# Patient Record
Sex: FEMALE | Race: ASIAN | Employment: FULL TIME | URBAN - METROPOLITAN AREA
[De-identification: names, ages, dates, MRNs, and addresses within clinical notes are randomized per-mention and may not be internally consistent; named-entity substitution may affect disease eponyms.]

---

## 2017-01-05 ENCOUNTER — HOSPITAL ENCOUNTER (OUTPATIENT)
Age: 37
Discharge: HOME | End: 2017-01-05
Attending: FAMILY MEDICINE
Payer: COMMERCIAL

## 2017-01-05 VITALS
OXYGEN SATURATION: 100 % | TEMPERATURE: 98 F | BODY MASS INDEX: 26.31 KG/M2 | RESPIRATION RATE: 20 BRPM | HEART RATE: 90 BPM | WEIGHT: 143 LBS | HEIGHT: 62 IN | SYSTOLIC BLOOD PRESSURE: 146 MMHG | DIASTOLIC BLOOD PRESSURE: 91 MMHG

## 2017-01-05 DIAGNOSIS — L50.9 URTICARIA: Primary | ICD-10-CM

## 2017-01-05 PROCEDURE — 99213 OFFICE O/P EST LOW 20 MIN: CPT

## 2017-01-05 PROCEDURE — 99204 OFFICE O/P NEW MOD 45 MIN: CPT

## 2017-01-05 RX ORDER — HYDROCORTISONE 25 MG/ML
1 LOTION TOPICAL 2 TIMES DAILY
Qty: 1 BOTTLE | Refills: 0 | Status: SHIPPED | OUTPATIENT
Start: 2017-01-05 | End: 2017-01-15

## 2017-01-05 RX ORDER — METHYLPREDNISOLONE 4 MG/1
TABLET ORAL
Qty: 1 PACKAGE | Refills: 0 | Status: SHIPPED | OUTPATIENT
Start: 2017-01-05 | End: 2017-01-10

## 2017-01-05 NOTE — ED PROVIDER NOTES
Patient Seen in: [address]    History   Patient presents with:  Rash Skin Problem (integumentary)    Stated Complaint: swollen eyes redness swollen face    HPI    76-year-old female presents with Over 3 weeks of rash 3- Comment: occasionally      Review of Systems    Positive for stated complaint: swollen eyes redness swollen face  Other systems are as noted in HPI. Constitutional and vital signs reviewed.       All other systems reviewed and negative except as noted Discussed need to follow-up immediately with emergency room if she develops any signs or symptoms of anaphylaxis.   Red flags of possible overlying bacterial infection discussed and patient will return immediately if she develops pain or any signs of infect

## 2017-01-05 NOTE — ED INITIAL ASSESSMENT (HPI)
Patient states having hx of allergic reaction and acne from makeup use. Patient states using retina-micro topical, redness occurred. Patient stopped using topical retina-amy about 2-3 weeks ago. Patient states facial and eye swelling since last Tuesday.

## 2017-01-28 ENCOUNTER — HOSPITAL ENCOUNTER (OUTPATIENT)
Age: 37
Discharge: ANOTHER HOSPITAL | End: 2017-01-28
Attending: EMERGENCY MEDICINE
Payer: COMMERCIAL

## 2017-01-28 ENCOUNTER — HOSPITAL ENCOUNTER (EMERGENCY)
Facility: HOSPITAL | Age: 37
Discharge: HOME | End: 2017-01-28
Attending: EMERGENCY MEDICINE
Payer: COMMERCIAL

## 2017-01-28 VITALS
WEIGHT: 143 LBS | HEIGHT: 62 IN | SYSTOLIC BLOOD PRESSURE: 110 MMHG | OXYGEN SATURATION: 100 % | HEART RATE: 99 BPM | TEMPERATURE: 100 F | BODY MASS INDEX: 26.31 KG/M2 | RESPIRATION RATE: 16 BRPM | DIASTOLIC BLOOD PRESSURE: 64 MMHG

## 2017-01-28 VITALS
WEIGHT: 125 LBS | TEMPERATURE: 102 F | SYSTOLIC BLOOD PRESSURE: 124 MMHG | OXYGEN SATURATION: 96 % | DIASTOLIC BLOOD PRESSURE: 86 MMHG | HEART RATE: 112 BPM | HEIGHT: 62 IN | BODY MASS INDEX: 23 KG/M2 | RESPIRATION RATE: 16 BRPM

## 2017-01-28 DIAGNOSIS — R50.9 FEVER, UNSPECIFIED FEVER CAUSE: Primary | ICD-10-CM

## 2017-01-28 DIAGNOSIS — L27.0 ALLERGIC DRUG RASH DUE TO SULFONAMIDE: ICD-10-CM

## 2017-01-28 DIAGNOSIS — R59.0 LYMPHADENOPATHY, CERVICAL: Primary | ICD-10-CM

## 2017-01-28 DIAGNOSIS — T37.0X5A ALLERGIC DRUG RASH DUE TO SULFONAMIDE: ICD-10-CM

## 2017-01-28 LAB
B-HCG UR QL: NEGATIVE
BASOPHILS # BLD: 0.2 K/UL (ref 0–0.2)
BASOPHILS NFR BLD: 5 %
BILIRUB UR QL: NEGATIVE
CLARITY UR: CLEAR
COLOR UR: YELLOW
EOSINOPHIL # BLD: 0 K/UL (ref 0–0.7)
EOSINOPHIL NFR BLD: 1 %
ERYTHROCYTE [DISTWIDTH] IN BLOOD BY AUTOMATED COUNT: 13.3 % (ref 11–15)
GLUCOSE UR-MCNC: NEGATIVE MG/DL
HCT VFR BLD AUTO: 42.5 % (ref 35–48)
HGB BLD-MCNC: 14.2 G/DL (ref 12–16)
HGB UR QL STRIP.AUTO: NEGATIVE
KETONES UR-MCNC: NEGATIVE MG/DL
LEUKOCYTE ESTERASE UR QL STRIP.AUTO: NEGATIVE
LYMPHOCYTES # BLD: 0.5 K/UL (ref 1–4)
LYMPHOCYTES NFR BLD: 12 %
MCH RBC QN AUTO: 29.1 PG (ref 27–32)
MCHC RBC AUTO-ENTMCNC: 33.3 G/DL (ref 32–37)
MCV RBC AUTO: 87.5 FL (ref 80–100)
MONOCYTES # BLD: 0.5 K/UL (ref 0–1)
MONOCYTES NFR BLD: 12 %
NEUTROPHILS # BLD AUTO: 2.9 K/UL (ref 1.8–7.7)
NEUTROPHILS NFR BLD: 71 %
NITRITE UR QL STRIP.AUTO: NEGATIVE
PH UR: 7 [PH] (ref 5–8)
PLATELET # BLD AUTO: 214 K/UL (ref 140–400)
PMV BLD AUTO: 7.8 FL (ref 7.4–10.3)
PROT UR-MCNC: NEGATIVE MG/DL
RBC # BLD AUTO: 4.86 M/UL (ref 3.7–5.4)
RBC #/AREA URNS AUTO: 3 /HPF
S PYO AG THROAT QL: NEGATIVE
SP GR UR STRIP: 1.01 (ref 1–1.03)
UROBILINOGEN UR STRIP-ACNC: <2
VIT C UR-MCNC: 20 MG/DL
WBC # BLD AUTO: 4.2 K/UL (ref 4–11)
WBC #/AREA URNS AUTO: 1 /HPF

## 2017-01-28 PROCEDURE — 81025 URINE PREGNANCY TEST: CPT

## 2017-01-28 PROCEDURE — 36415 COLL VENOUS BLD VENIPUNCTURE: CPT

## 2017-01-28 PROCEDURE — 87430 STREP A AG IA: CPT

## 2017-01-28 PROCEDURE — 99215 OFFICE O/P EST HI 40 MIN: CPT

## 2017-01-28 PROCEDURE — 85025 COMPLETE CBC W/AUTO DIFF WBC: CPT | Performed by: EMERGENCY MEDICINE

## 2017-01-28 PROCEDURE — 81003 URINALYSIS AUTO W/O SCOPE: CPT | Performed by: EMERGENCY MEDICINE

## 2017-01-28 PROCEDURE — 99283 EMERGENCY DEPT VISIT LOW MDM: CPT

## 2017-01-28 RX ORDER — IBUPROFEN 600 MG/1
600 TABLET ORAL ONCE
Status: COMPLETED | OUTPATIENT
Start: 2017-01-28 | End: 2017-01-28

## 2017-01-28 NOTE — ED PROVIDER NOTES
Patient Seen in: [facility] In [address] [place] [address]    History   Patient presents with:  Fever  Rash Skin Problem (integumentary)    Stated Complaint: Neck Pain    HPI    Patient is a 27-year-old female who complains of fever.   It started 2 day 124/86 mmHg   Pulse 01/28/17 1031 112   Resp 01/28/17 1031 16   Temp 01/28/17 1031 102.2 °F (39 °C)   Temp src 01/28/17 1031 Oral   SpO2 01/28/17 1031 96 %   O2 Device 01/28/17 1031 None (Room air)       Current:/86 mmHg  Pulse 112  Temp(Src) 102.2 ° that she may benefit from further evaluation.   Clinically she does not seem to have meningitis however her symptoms warrant further evaluation she agrees and will go to the emergency department  MDM           Disposition and Plan     Clinical Impression:

## 2017-01-28 NOTE — ED INITIAL ASSESSMENT (HPI)
Fever started Thursday. Skin rash which starred today. Pt was on Bactrim Last dose last night.  Also c/o neck pain

## 2017-01-28 NOTE — ED PROVIDER NOTES
Patient Seen in: [facility] Emergency Department    History   Patient presents with:  Fever Sepsis (infectious)    Stated Complaint: fever, sent from [facility]    HPI    Patient is a 58-year-old female who presents to the emergency department with a Current:/64 mmHg  Pulse 99  Temp(Src) 100.4 °F (38 °C) (Oral)  Resp 16  Ht 157.5 cm (5' 2\")  Wt 64.864 kg  BMI 26.15 kg/m2  SpO2 100%        Physical Exam   Constitutional: She is oriented to person, place, and time.  She appears well-developed ------                     CBC W/ DIFFERENTIAL[[id number]]          Abnormal            Final result                 Please view results for these tests on the individual orders.    RAINBOW DRAW GOLD   RAINBOW DRAW LIGHT GREEN       MDM           Dis

## 2017-02-26 ENCOUNTER — CHARTING TRANS (OUTPATIENT)
Dept: OTHER | Age: 37
End: 2017-02-26

## 2017-03-10 PROCEDURE — 88175 CYTOPATH C/V AUTO FLUID REDO: CPT | Performed by: OBSTETRICS & GYNECOLOGY

## 2018-03-12 PROCEDURE — 88175 CYTOPATH C/V AUTO FLUID REDO: CPT | Performed by: OBSTETRICS & GYNECOLOGY

## 2018-11-05 VITALS
HEART RATE: 80 BPM | WEIGHT: 143 LBS | RESPIRATION RATE: 20 BRPM | TEMPERATURE: 98.6 F | HEIGHT: 62 IN | DIASTOLIC BLOOD PRESSURE: 76 MMHG | SYSTOLIC BLOOD PRESSURE: 110 MMHG | BODY MASS INDEX: 26.31 KG/M2

## 2019-03-14 PROCEDURE — 88175 CYTOPATH C/V AUTO FLUID REDO: CPT | Performed by: OBSTETRICS & GYNECOLOGY

## 2022-02-18 ENCOUNTER — OFFICE VISIT (OUTPATIENT)
Dept: OBGYN CLINIC | Facility: CLINIC | Age: 42
End: 2022-02-18
Payer: COMMERCIAL

## 2022-02-18 VITALS
WEIGHT: 132 LBS | HEART RATE: 77 BPM | DIASTOLIC BLOOD PRESSURE: 78 MMHG | SYSTOLIC BLOOD PRESSURE: 121 MMHG | BODY MASS INDEX: 25 KG/M2

## 2022-02-18 DIAGNOSIS — Z12.31 ENCOUNTER FOR SCREENING MAMMOGRAM FOR BREAST CANCER: ICD-10-CM

## 2022-02-18 DIAGNOSIS — Z01.419 ENCOUNTER FOR GYNECOLOGICAL EXAMINATION: Primary | ICD-10-CM

## 2022-02-18 PROCEDURE — 3074F SYST BP LT 130 MM HG: CPT | Performed by: OBSTETRICS & GYNECOLOGY

## 2022-02-18 PROCEDURE — 3078F DIAST BP <80 MM HG: CPT | Performed by: OBSTETRICS & GYNECOLOGY

## 2022-02-18 PROCEDURE — 99386 PREV VISIT NEW AGE 40-64: CPT | Performed by: OBSTETRICS & GYNECOLOGY

## 2022-02-21 LAB — HPV I/H RISK 1 DNA SPEC QL NAA+PROBE: NEGATIVE

## 2022-03-06 ENCOUNTER — HOSPITAL ENCOUNTER (OUTPATIENT)
Dept: MAMMOGRAPHY | Facility: HOSPITAL | Age: 42
Discharge: HOME | End: 2022-03-06
Attending: OBSTETRICS & GYNECOLOGY
Payer: COMMERCIAL

## 2022-03-06 DIAGNOSIS — Z12.31 ENCOUNTER FOR SCREENING MAMMOGRAM FOR BREAST CANCER: ICD-10-CM

## 2022-03-06 PROCEDURE — 77067 SCR MAMMO BI INCL CAD: CPT | Performed by: OBSTETRICS & GYNECOLOGY

## 2022-03-06 PROCEDURE — 77063 BREAST TOMOSYNTHESIS BI: CPT | Performed by: OBSTETRICS & GYNECOLOGY

## 2023-04-04 ENCOUNTER — OFFICE VISIT (OUTPATIENT)
Dept: OBGYN CLINIC | Facility: CLINIC | Age: 43
End: 2023-04-04

## 2023-04-04 VITALS
BODY MASS INDEX: 25 KG/M2 | HEART RATE: 77 BPM | SYSTOLIC BLOOD PRESSURE: 113 MMHG | WEIGHT: 134.81 LBS | DIASTOLIC BLOOD PRESSURE: 77 MMHG

## 2023-04-04 DIAGNOSIS — Z12.31 ENCOUNTER FOR SCREENING MAMMOGRAM FOR BREAST CANCER: ICD-10-CM

## 2023-04-04 DIAGNOSIS — T83.32XA INTRAUTERINE CONTRACEPTIVE DEVICE THREADS LOST, INITIAL ENCOUNTER: ICD-10-CM

## 2023-04-04 DIAGNOSIS — Z01.419 ENCOUNTER FOR GYNECOLOGICAL EXAMINATION: Primary | ICD-10-CM

## 2023-04-25 ENCOUNTER — HOSPITAL ENCOUNTER (OUTPATIENT)
Dept: MAMMOGRAPHY | Facility: HOSPITAL | Age: 43
Discharge: HOME | End: 2023-04-25
Attending: OBSTETRICS & GYNECOLOGY
Payer: COMMERCIAL

## 2023-04-25 DIAGNOSIS — Z12.31 ENCOUNTER FOR SCREENING MAMMOGRAM FOR BREAST CANCER: ICD-10-CM

## 2023-04-25 PROCEDURE — 77063 BREAST TOMOSYNTHESIS BI: CPT | Performed by: OBSTETRICS & GYNECOLOGY

## 2023-04-25 PROCEDURE — 77067 SCR MAMMO BI INCL CAD: CPT | Performed by: OBSTETRICS & GYNECOLOGY

## 2023-05-01 ENCOUNTER — HOSPITAL ENCOUNTER (OUTPATIENT)
Dept: ULTRASOUND IMAGING | Facility: HOSPITAL | Age: 43
Discharge: HOME | End: 2023-05-01
Attending: OBSTETRICS & GYNECOLOGY
Payer: COMMERCIAL

## 2023-05-01 DIAGNOSIS — T83.32XA INTRAUTERINE CONTRACEPTIVE DEVICE THREADS LOST, INITIAL ENCOUNTER: ICD-10-CM

## 2023-05-01 PROCEDURE — 76856 US EXAM PELVIC COMPLETE: CPT | Performed by: OBSTETRICS & GYNECOLOGY

## 2023-05-01 PROCEDURE — 76830 TRANSVAGINAL US NON-OB: CPT | Performed by: OBSTETRICS & GYNECOLOGY

## 2024-04-29 ENCOUNTER — OFFICE VISIT (OUTPATIENT)
Dept: OBGYN CLINIC | Facility: CLINIC | Age: 44
End: 2024-04-29
Payer: COMMERCIAL

## 2024-04-29 VITALS
DIASTOLIC BLOOD PRESSURE: 69 MMHG | WEIGHT: 129.88 LBS | SYSTOLIC BLOOD PRESSURE: 105 MMHG | HEART RATE: 65 BPM | BODY MASS INDEX: 24 KG/M2

## 2024-04-29 DIAGNOSIS — Z12.31 ENCOUNTER FOR SCREENING MAMMOGRAM FOR BREAST CANCER: ICD-10-CM

## 2024-04-29 DIAGNOSIS — Z01.419 ENCOUNTER FOR GYNECOLOGICAL EXAMINATION: Primary | ICD-10-CM

## 2024-04-29 PROCEDURE — 99396 PREV VISIT EST AGE 40-64: CPT | Performed by: OBSTETRICS & GYNECOLOGY

## 2024-04-30 NOTE — PROGRESS NOTES
Devon Shah is a 43 year old female  No LMP recorded. (Menstrual status: IUD - Intrauterine Device). here for annual exam.       Last seen 2023.   Last pap 2022 normal with neg HPV    Last mammogram 2023.    Had Mirena IUD exchange in  at [facility].  No periods.  Had pelvic ultrasound on 2023 due to no IUD strings seen-- IUD in place.      Declined STD screen        OBSTETRICS HISTORY:  OB History    Para Term  AB Living   2 2 2 0 0 2   SAB IAB Ectopic Multiple Live Births   0 0 0 0 2       GYNE HISTORY   Menarche: 8   Use of Birth Control (if yes, specify type): IUD, Mirena  Hx Prior Abnormal Pap: No  Pap Date: 22  Pap Result Notes: Neg Pap/HPv // Mammo 23 Jean Claude neg  Follow Up Recommendation: annual 2023 EDMUND    MEDICAL HISTORY:  Past Medical History:    Childhood asthma (HCC)    Psoriasis of scalp     No past surgical history on file.    SOCIAL HISTORY:  Social History     Socioeconomic History    Marital status:    Tobacco Use    Smoking status: Never    Smokeless tobacco: Never   Vaping Use    Vaping status: Never Used   Substance and Sexual Activity    Alcohol use: Yes     Alcohol/week: 0.0 standard drinks of alcohol     Comment: occasionally    Drug use: No    Sexual activity: Yes     Partners: Male     Birth control/protection: I.U.D., Mirena   Other Topics Concern    Caffeine Concern Yes     Comment: coffee 3 cups     Social Determinants of Health      [facility]       FAMILY HISTORY:  Family History   Problem Relation Age of Onset    Hypertension Mother     Diabetes Mother     Diabetes Father     Hypertension Father     Heart Disorder Daughter 0        PDA    Other (Other) Daughter     Breast Cancer Maternal Aunt     Diabetes Paternal Aunt        MEDICATIONS:  No current outpatient medications on file.       ALLERGIES:    Allergies   Allergen Reactions    Bactrim [Sulfamethoxazole W/Trimethoprim] RASH and FEVER     Dog Dander [Dander]      And cats    Parabens SWELLING    Tetracycline UNKNOWN         Depression Screening (PHQ-2/PHQ-9): Over the LAST 2 WEEKS   Little interest or pleasure in doing things (over the last two weeks)?: Not at all    Feeling down, depressed, or hopeless (over the last two weeks)?: Not at all    PHQ-2 SCORE: 0           Review of Systems:  Constitutional:  Denies fatigue, night sweats, hot flashes  Eyes:  denies blurred or double vision  Cardiovascular:  denies chest pain or palpitations  Respiratory:  denies shortness of breath  Gastrointestinal:  denies heartburn, abdominal pain, diarrhea or constipation  Genitourinary:  denies dysuria, incontinence, abnormal vaginal discharge, vaginal itching  Musculoskeletal:  denies back pain.  Skin/Breast:  Denies any breast pain, lumps, or discharge.   Neurological:  denies headaches, extremity weakness or numbness.  Psychiatric: denies depression or anxiety.  Endocrine:   denies excessive thirst or urination.  Heme/Lymph:  easy bruising or bleeding.    PHYSICAL EXAM:   /69   Pulse 65   Wt 129 lb 14.4 oz (58.9 kg)   BMI 24.15 kg/m²   Wt Readings from Last 2 Encounters:   04/29/24 129 lb 14.4 oz (58.9 kg)   04/04/23 134 lb 12.8 oz (61.1 kg)     Body mass index is 24.15 kg/m².    Constitutional: well developed, well nourished  Neck/Thyroid: thyroid symmetric, no nodules  Heart:  Regular rate and rhythm  Lungs:  Clear to asculation  Breast: normal without palpable masses, tenderness, asymmetry, nipple discharge, nipple retraction or skin changes  Abdomen:  soft, nontender, nondistended, no masses  Psychiatric:  Oriented to time, place, person and situation. Appropriate mood and affect    Pelvic Exam:  External Genitalia: normal appearance, hair distribution, and no lesions  Urethral Meatus:  normal in size, location, without lesions and prolapse  Bladder:  No fullness, masses or tenderness  Vagina:  Normal appearance without lesions, no abnormal  discharge  Cervix:  Normal without tenderness on motion.  IUD strings not seen.  Tip of string felt with scopette stick.  Uterus: normal in size, contour, position, mobility, without tenderness  Adnexa: normal without masses or tenderness  Perineum/anus: normal      Assessment & Plan:    Devon Shah is a 43 year old female who presents for an annual physical exam.    1. Encounter for gynecological examination  Pap not done.  ASCCP guidelines reviewed.   Encouraged annual exam.    RTC 1 year or prn     2. Encounter for screening mammogram for breast cancer  - [facility] ISSA 2D+3D SCREENING BILAT (CPT=77067/88879); Future          Requested Prescriptions      No prescriptions requested or ordered in this encounter         Angeli Hyde MD  4/30/2024  10:52 AM

## 2024-05-09 ENCOUNTER — HOSPITAL ENCOUNTER (OUTPATIENT)
Dept: MAMMOGRAPHY | Age: 44
Discharge: HOME | End: 2024-05-09
Attending: OBSTETRICS & GYNECOLOGY
Payer: COMMERCIAL

## 2024-05-09 DIAGNOSIS — Z12.31 ENCOUNTER FOR SCREENING MAMMOGRAM FOR BREAST CANCER: ICD-10-CM

## 2024-05-09 PROCEDURE — 77063 BREAST TOMOSYNTHESIS BI: CPT | Performed by: OBSTETRICS & GYNECOLOGY

## 2024-05-09 PROCEDURE — 77067 SCR MAMMO BI INCL CAD: CPT | Performed by: OBSTETRICS & GYNECOLOGY

## 2025-06-10 ENCOUNTER — OFFICE VISIT (OUTPATIENT)
Dept: OBGYN CLINIC | Facility: CLINIC | Age: 45
End: 2025-06-10
Payer: COMMERCIAL

## 2025-06-10 VITALS
SYSTOLIC BLOOD PRESSURE: 117 MMHG | WEIGHT: 136.63 LBS | HEART RATE: 74 BPM | DIASTOLIC BLOOD PRESSURE: 80 MMHG | BODY MASS INDEX: 25 KG/M2

## 2025-06-10 DIAGNOSIS — Z01.419 ENCOUNTER FOR GYNECOLOGICAL EXAMINATION: Primary | ICD-10-CM

## 2025-06-10 DIAGNOSIS — Z12.4 SCREENING FOR CERVICAL CANCER: ICD-10-CM

## 2025-06-10 DIAGNOSIS — Z12.31 ENCOUNTER FOR SCREENING MAMMOGRAM FOR BREAST CANCER: ICD-10-CM

## 2025-06-10 PROCEDURE — 99396 PREV VISIT EST AGE 40-64: CPT | Performed by: OBSTETRICS & GYNECOLOGY

## 2025-06-10 NOTE — PROGRESS NOTES
The following individual(s) verbally consented to be recorded using ambient AI listening technology and understand that they can each withdraw their consent to this listening technology at any point by asking the clinician to turn off or pause the recording:    Patient name: Devon Shah  Additional names:

## 2025-06-11 LAB — HPV E6+E7 MRNA CVX QL NAA+PROBE: NEGATIVE

## 2025-06-11 NOTE — PROGRESS NOTES
Devon Shah is a 44 year old female  No LMP recorded. (Menstrual status: IUD - Intrauterine Device). here for annual exam.       History of Present Illness  Devon Shah is a 44 year old female who presents for an annual gynecological exam.  Last seen 2024.    Last pap 2022 normal with negative HPV  Last mammogram 2025    She has a Mirena IUD exchange in  at [facility] which she describes as a traumatic experience.  No periods.  A pelvic ultrasound in  due to no IUD strings seen-- confirmed the IUD in place.  Declined STD screen.    She reports no significant changes in her health. No hot flashes or night sweats. She experiences occasional palpitations, .      OBSTETRICS HISTORY:  OB History    Para Term  AB Living   2 2 2 0 0 2   SAB IAB Ectopic Multiple Live Births   0 0 0 0 2       GYNE HISTORY   Menarche: 8   Use of Birth Control (if yes, specify type): IUD, Mirena  Hx Prior Abnormal Pap: No  Pap Date: 22  Pap Result Notes: Neg Pap/HPv // Ma  Follow Up Recommendation: mmo 24 Jean Claude neg    MEDICAL HISTORY:  Past Medical History:    Childhood asthma (HCC)    Psoriasis of scalp     No past surgical history on file.    SOCIAL HISTORY:  Social History     Socioeconomic History    Marital status:    Tobacco Use    Smoking status: Never    Smokeless tobacco: Never   Vaping Use    Vaping status: Never Used   Substance and Sexual Activity    Alcohol use: Yes     Alcohol/week: 0.0 standard drinks of alcohol     Comment: occasionally    Drug use: No    Sexual activity: Yes     Partners: Male     Birth control/protection: I.U.D., Mirena   Other Topics Concern    Caffeine Concern Yes     Comment: coffee 3 cups     Social Drivers of Health     Food Insecurity: No Food Insecurity (6/10/2025)    NCSS - Food Insecurity     Worried About Running Out of Food in the Last Year: No     Ran Out of Food in the Last Year: No   Transportation Needs: No Transportation Needs  (6/10/2025)    NCSS - Transportation     Lack of Transportation: No   Housing Stability: Not At Risk (6/10/2025)    NCSS - Housing/Utilities     Has Housing: Yes     Worried About Losing Housing: No     Unable to Get Utilities: No       FAMILY HISTORY:  Family History   Problem Relation Age of Onset    Hypertension Mother     Diabetes Mother     Diabetes Father     Hypertension Father     Heart Disorder Daughter 0        PDA    Other (Other) Daughter     Breast Cancer Maternal Aunt     Diabetes Paternal Aunt        MEDICATIONS:  No current outpatient medications on file.       ALLERGIES:    Allergies   Allergen Reactions    Bactrim [Sulfamethoxazole W/Trimethoprim] RASH and FEVER    Dog Dander [Dander]      And cats    Parabens SWELLING    Tetracycline UNKNOWN         Depression Screening (PHQ-2/PHQ-9): Over the LAST 2 WEEKS   Little interest or pleasure in doing things (over the last two weeks)?: Not at all    Feeling down, depressed, or hopeless (over the last two weeks)?: Not at all    PHQ-2 SCORE: 0           Review of Systems:  Constitutional:  Denies fatigue, night sweats, hot flashes  Eyes:  denies blurred or double vision  Cardiovascular:  denies chest pain or palpitations  Respiratory:  denies shortness of breath  Gastrointestinal:  denies heartburn, abdominal pain, diarrhea or constipation  Genitourinary:  denies dysuria, incontinence, abnormal vaginal discharge, vaginal itching  Musculoskeletal:  denies back pain.  Skin/Breast:  Denies any breast pain, lumps, or discharge.   Neurological:  denies headaches, extremity weakness or numbness.  Psychiatric: denies depression or anxiety.  Endocrine:   denies excessive thirst or urination.  Heme/Lymph:  easy bruising or bleeding.    PHYSICAL EXAM:   /80   Pulse 74   Wt 136 lb 9.6 oz (62 kg)   BMI 25.39 kg/m²   Wt Readings from Last 2 Encounters:   06/10/25 136 lb 9.6 oz (62 kg)   04/29/24 129 lb 14.4 oz (58.9 kg)     Body mass index is 25.39  kg/m².    Constitutional: well developed, well nourished  Neck/Thyroid: thyroid symmetric, no nodules  Heart:  Regular rate and rhythm  Lungs:  Clear to asculation  Breast: normal without palpable masses, tenderness, asymmetry, nipple discharge, nipple retraction or skin changes  Abdomen:  soft, nontender, nondistended, no masses  Psychiatric:  Oriented to time, place, person and situation. Appropriate mood and affect    Pelvic Exam:  External Genitalia: normal appearance, hair distribution, and no lesions  Urethral Meatus:  normal in size, location, without lesions and prolapse  Bladder:  No fullness, masses or tenderness  Vagina:  Normal appearance without lesions, no abnormal discharge  Cervix:  Normal without tenderness on motion.  IUD strings not seen   Uterus: normal in size, contour, position, mobility, without tenderness  Adnexa: normal without masses or tenderness  Perineum/anus: normal      Assessment & Plan:    Devon Shah is a 44 year old female who presents for an annual physical exam.    1. Encounter for gynecological examination  Pap and HPV.   Will do Pap/HPV q 3 years.   Annual exams encouraged.  Order for mammogram.   RTC 1 year or prn     2. Encounter for screening mammogram for breast cancer  - [facility] ISSA 2D+3D SCREENING BILAT (CPT=77067/38727); Future        Requested Prescriptions      No prescriptions requested or ordered in this encounter         Angeli Hyde MD  6/11/2025  7:36 AM

## 2025-06-21 ENCOUNTER — HOSPITAL ENCOUNTER (OUTPATIENT)
Dept: MAMMOGRAPHY | Facility: HOSPITAL | Age: 45
Discharge: HOME | End: 2025-06-21
Attending: OBSTETRICS & GYNECOLOGY
Payer: COMMERCIAL

## 2025-06-21 DIAGNOSIS — Z12.31 ENCOUNTER FOR SCREENING MAMMOGRAM FOR BREAST CANCER: ICD-10-CM

## 2025-06-21 PROCEDURE — 77067 SCR MAMMO BI INCL CAD: CPT | Performed by: OBSTETRICS & GYNECOLOGY

## 2025-06-21 PROCEDURE — 77063 BREAST TOMOSYNTHESIS BI: CPT | Performed by: OBSTETRICS & GYNECOLOGY

## (undated) NOTE — ED AVS SNAPSHOT
[facility] Emergency [facility]    [address]     [place] [address]    Phone:  [phone]    Fax:  [phone]           Tamika Felipe   MRN: [id number]    Department:  [facility]   Date of Visit:  1/ and Class Registration line at [phone] or find a doctor online by visiting [email or web address].    IF THERE IS ANY CHANGE OR WORSENING OF YOUR CONDITION, CALL YOUR PRIMARY CARE PHYSICIAN AT ONCE OR RETURN IMMEDIATELY TO [address] [place] [address].     If

## (undated) NOTE — ED AVS SNAPSHOT
[facility] in [place] [address]    Phone:  [phone]    Fax:  [address] [place]   MRN: [id number]    Department:  [facility]   Date of Visit: Benadryl 50 mg daily for next 3-4 days    Discharge References/Attachments     SKIN RASHES, SELF-CARE FOR (ENGLISH)    HIVES (ADULT) (ENGLISH)      Disclosure     Insurance plans vary and the physician(s) referred by the Immediate Care may not be covered Registration line at [phone] or find a doctor online by visiting [email or web address].    IF THERE IS ANY CHANGE OR WORSENING OF YOUR CONDITION, CALL YOUR PRIMARY CARE PHYSICIAN AT ONCE OR GO TO [address] [place] [address].     If you have been prescribed a - If you have concerns related to behavioral health issues or thoughts of harming yourself, contact [address] [place] [address] at [phone].     - If you don’t have insurance, Jaimee Krishnan has partnered with [employer]

## (undated) NOTE — ED AVS SNAPSHOT
[facility]    [address]     [place] [address]    Phone:  [phone]    Fax:  [phone]           Bayron Pineda   MRN: [id number]    Department:  [facility]   Date of Visit:  1/ It is our goal to assure that you are completely satisfied with every aspect of your visit today.   In an effort to constantly improve our service to you, we would appreciate any positive or negative feedback related to the care you received in our emergenc [employer] account. You may have had testing done that requires us to contact you. Please make sure we have your correct phone number on file.       I certified that I have received a copy of the aftercare instructions; that these instructions have been expl information, go to [email or web address] and click on the Sign Up Now link in the [employer] box. Enter your [employer] Activation Code exactly as it appears below along with your Zip Code and Date of Birth to complete the sign-up process.  If you do